# Patient Record
Sex: FEMALE | Race: WHITE
[De-identification: names, ages, dates, MRNs, and addresses within clinical notes are randomized per-mention and may not be internally consistent; named-entity substitution may affect disease eponyms.]

---

## 2020-08-05 ENCOUNTER — HOSPITAL ENCOUNTER (EMERGENCY)
Dept: HOSPITAL 11 - JP.ED | Age: 70
Discharge: HOME | End: 2020-08-05
Payer: MEDICARE

## 2020-08-05 DIAGNOSIS — Z88.0: ICD-10-CM

## 2020-08-05 DIAGNOSIS — Z91.030: ICD-10-CM

## 2020-08-05 DIAGNOSIS — T63.441A: Primary | ICD-10-CM

## 2020-08-05 DIAGNOSIS — Z88.2: ICD-10-CM

## 2020-08-05 DIAGNOSIS — T78.2XXA: ICD-10-CM

## 2020-08-05 PROCEDURE — 96375 TX/PRO/DX INJ NEW DRUG ADDON: CPT

## 2020-08-05 PROCEDURE — 99282 EMERGENCY DEPT VISIT SF MDM: CPT

## 2020-08-05 PROCEDURE — 96372 THER/PROPH/DIAG INJ SC/IM: CPT

## 2020-08-05 PROCEDURE — 96374 THER/PROPH/DIAG INJ IV PUSH: CPT

## 2020-08-05 NOTE — EDM.PDOC
ED HPI GENERAL MEDICAL PROBLEM





- General


Chief Complaint: Allergic Reaction


Stated Complaint: BEE STING


Time Seen by Provider: 08/05/20 19:30


Source of Information: Reports: Patient, RN, RN Notes Reviewed


History Limitations: Reports: No Limitations





- History of Present Illness


INITIAL COMMENTS - FREE TEXT/NARRATIVE: 





Pt here to ER with spouse after wasp sting to bottom lip. Face is red and 

edematous in addition to actual wasp sting to bottom lip. Automatically 

anaphylaxis protocol in place. VSS. No immediate signs of airway compromise. Pt 

is talking and no respiratory distress noted. No pain score given.  


Onset: Today


Onset Date: 08/05/20


Onset Time: 19:00


Duration: Minutes:


Location: Reports: Face, Other (Bottom lip)


Quality: Reports: Other (Swollen and red from wasp sting )


Associated Symptoms: Reports: Other (Edema of lips and BL cheeks)


  ** denies pain


Pain Score (Numeric/FACES): 0





- Related Data


                                    Allergies











Allergy/AdvReac Type Severity Reaction Status Date / Time


 


bee venom protein (honey bee) Allergy  Swelling Verified 08/05/20 19:47


 


Penicillins Allergy  Rash Verified 08/05/20 19:47


 


Sulfa (Sulfonamide Allergy  Rash Verified 08/05/20 19:47





Antibiotics)     











Home Meds: 


                                    Home Meds





lisinopriL [Lisinopril] 40 mg PO DAILY 08/05/20 [History]











ED ROS ALLERGIC REACTION





- Review of Systems


Review Of Systems: See Below


Constitutional: Reports: No Symptoms


HEENT: Reports: Other (bottom lip from sting )


Cardiovascular: Reports: No Symptoms


Endocrine: Reports: No Symptoms


GI/Abdominal: Reports: No Symptoms


: Reports: No Symptoms


Musculoskeletal: Reports: No Symptoms


Skin: Reports: No Symptoms, Other (Bottom lip swollen and red from stin g)


Neurological: Reports: No Symptoms


Psychiatric: Reports: No Symptoms


Hematologic/Lymphatic: Reports: No Symptoms


Immunologic: Reports: No Symptoms





ED EXAM GENERAL NO PERIP PULSE





- Physical Exam


Exam: See Below


Exam Limited By: No Limitations


General Appearance: Alert, WD/WN, No Apparent Distress


Ears: Normal External Exam


Throat/Mouth: Normal Inspection, Normal Lips, Normal Oropharynx, Normal Voice


Head: Atraumatic, Normocephalic


Neck: Normal Inspection, Supple


Respiratory/Chest: No Respiratory Distress, Lungs Clear, Normal Breath Sounds, 

No Accessory Muscle Use


Cardiovascular: Regular Rate, Rhythm


 (Female) Exam: Deferred


Rectal (Female) Exam: Deferred


Extremities: Normal Inspection, Normal Range of Motion


Neurological: Alert, Oriented, CN II-XII Intact, Normal Cognition


Psychiatric: Normal Affect, Normal Mood


Skin Exam: Warm, Dry, Other (Swollen bottom lip from sting )


Lymphatic: No Adenopathy





Course





- Vital Signs


Last Recorded V/S: 


                                Last Vital Signs











Temp  36.2 C   08/05/20 20:08


 


Pulse  59 L  08/05/20 20:08


 


Resp  18   08/05/20 20:08


 


BP  164/90 H  08/05/20 20:08


 


Pulse Ox  99   08/05/20 20:08














- Orders/Labs/Meds


Orders: 


                               Active Orders 24 hr











 Category Date Time Status


 


 Peripheral IV Care [RC] .AS DIRECTED Care  08/05/20 19:49 Active


 


 Sodium Chloride 0.9% [Saline Flush] Med  08/05/20 19:49 Active





 10 ml FLUSH ASDIRECTED PRN   


 


 Peripheral IV Insertion Adult [OM.PC] Routine Oth  08/05/20 19:49 Ordered








                                Medication Orders





Sodium Chloride (Saline Flush)  10 ml FLUSH ASDIRECTED PRN


   PRN Reason: Keep Vein Open


   Last Admin: 08/05/20 20:18  Dose: 10 ml


   Documented by: DEVEN








Meds: 


Medications











Generic Name Dose Route Start Last Admin





  Trade Name Freq  PRN Reason Stop Dose Admin


 


Sodium Chloride  10 ml  08/05/20 19:49  08/05/20 20:18





  Saline Flush  FLUSH   10 ml





  ASDIRECTED PRN   Administration





  Keep Vein Open  














Discontinued Medications














Generic Name Dose Route Start Last Admin





  Trade Name Freq  PRN Reason Stop Dose Admin


 


Diphenhydramine HCl  25 mg  08/05/20 19:51  08/05/20 20:16





  Benadryl  IVPUSH  08/05/20 19:52  25 mg





  ONETIME ONE   Administration


 


Epinephrine HCl  0.4 mg  08/05/20 19:53  08/05/20 19:31





  Adrenalin  IM  08/05/20 19:54  0.4 mg





  ONETIME ONE   Administration


 


Famotidine  20 mg  08/05/20 19:51  08/05/20 20:18





  Pepcid  IVPUSH  08/05/20 19:52  20 mg





  ONETIME ONE   Administration


 


Sodium Chloride  1,000 mls @ 999 mls/hr  08/05/20 19:50  08/05/20 20:17





  Normal Saline  IV  08/05/20 20:50  999 mls/hr





  ASDIRECTED ONE   Administration


 


Methylprednisolone Sodium Succinate  125 mg  08/05/20 19:53  08/05/20 20:15





  Solu-Medrol  IVPUSH  08/05/20 19:54  125 mg





  ONETIME ONE   Administration














- Re-Assessments/Exams


Free Text/Narrative Re-Assessment/Exam: 





08/05/20 19:48


Examione pt and proceed with Benadryl, Pepcid, Solu medrol, and Epi. 


Bolus will be given.


08/05/20 21:24


Bolus done. Pt improving great. VSS. Airway not compromised. 


08/05/20 21:44


Ok to D/C home. Pt stable





Departure





- Departure


Time of Disposition: 21:44


Disposition: Home, Self-Care 01


Condition: Good


Clinical Impression: 


 Bee sting-induced anaphylaxis








- Discharge Information


*PRESCRIPTION DRUG MONITORING PROGRAM REVIEWED*: Not Applicable


*COPY OF PRESCRIPTION DRUG MONITORING REPORT IN PATIENT [facility]DI: Not Applicable


Instructions:  Anaphylactic Reaction, Adult


Referrals: 


PCP,None [Primary Care Provider] - 


Forms:  ED Department Discharge


Care Plan Goals: 


Please take 40 mg Prednisone by mouth in the morning for 5 days


Please take 1 tablet (25 mg) Benadryl every 6 hours by mouth as needed for 

itching/rash


Please take 1 tablet (20 mg) Famotidine by mouth in the morning for 14 days as 

stress ulcer prevention and antihistamine 


Please fill prescription for Epi pens at your earliest convenience 


If symptoms do not improve or worsen, please contact your nearest medical 

facility





Sepsis Event Note (ED)





- Focused Exam


Vital Signs: 


                                   Vital Signs











  Temp Pulse Resp BP Pulse Ox


 


 08/05/20 20:08  36.2 C  59 L  18  164/90 H  99


 


 08/05/20 19:51   59 L  18  164/90 H  99


 


 08/05/20 19:27  36.2 C  55 L   220/115 H  98














- Problem List & Annotations


(1) Bee sting-induced anaphylaxis


SNOMED Code(s): 484178901


   Code(s): T63.441A - TOXIC EFFECT OF VENOM OF BEES, ACCIDENTAL, INIT   Status:

Acute   Priority: High   Current Visit: Yes   Onset Date: ~08/05/20   


Qualifiers: 


   Encounter type: initial encounter   Injury intent: accidental or unint

entional   Qualified Code(s): T63.441A - Toxic effect of venom of bees, 

accidental (unintentional), initial encounter   





- Problem List Review


Problem List Initiated/Reviewed/Updated: Yes





- My Orders


Last 24 Hours: 


My Active Orders





08/05/20 19:49


Peripheral IV Care [RC] .AS DIRECTED 


Sodium Chloride 0.9% [Saline Flush]   10 ml FLUSH ASDIRECTED PRN 


Peripheral IV Insertion Adult [OM.PC] Routine 














- Assessment/Plan


Last 24 Hours: 


My Active Orders





08/05/20 19:49


Peripheral IV Care [RC] .AS DIRECTED 


Sodium Chloride 0.9% [Saline Flush]   10 ml FLUSH ASDIRECTED PRN 


Peripheral IV Insertion Adult [OM.PC] Routine 











Plan: 





Please take 40 mg Prednisone by mouth in the morning for 5 days


Please take 1 tablet (25 mg) Benadryl every 6 hours by mouth as needed for 

itching/rash


Please take 1 tablet (20 mg) Famotidine by mouth in the morning for 14 days as 

stress ulcer prevention and antihistamine 


Please fill prescription for Epi pens at your earliest convenience 


If symptoms do not improve or worsen, please contact your nearest medical 

facility